# Patient Record
Sex: FEMALE | Race: WHITE | NOT HISPANIC OR LATINO | Employment: FULL TIME | URBAN - METROPOLITAN AREA
[De-identification: names, ages, dates, MRNs, and addresses within clinical notes are randomized per-mention and may not be internally consistent; named-entity substitution may affect disease eponyms.]

---
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
## 2023-04-26 ENCOUNTER — OFFICE VISIT (OUTPATIENT)
Dept: URGENT CARE | Facility: CLINIC | Age: 23
End: 2023-04-26
Payer: COMMERCIAL

## 2023-04-26 VITALS
TEMPERATURE: 98.7 F | WEIGHT: 117 LBS | HEIGHT: 66 IN | HEART RATE: 80 BPM | BODY MASS INDEX: 18.8 KG/M2 | RESPIRATION RATE: 16 BRPM | DIASTOLIC BLOOD PRESSURE: 78 MMHG | SYSTOLIC BLOOD PRESSURE: 118 MMHG | OXYGEN SATURATION: 96 %

## 2023-04-26 DIAGNOSIS — H00.019 HORDEOLUM EXTERNUM, UNSPECIFIED LATERALITY: ICD-10-CM

## 2023-04-26 DIAGNOSIS — Z30.46 ENCOUNTER FOR SURVEILLANCE OF NEXPLANON SUBDERMAL CONTRACEPTIVE: ICD-10-CM

## 2023-04-26 PROCEDURE — 99213 OFFICE O/P EST LOW 20 MIN: CPT | Performed by: PHYSICIAN ASSISTANT

## 2023-04-26 RX ORDER — OLANZAPINE 5 MG/1
5 TABLET ORAL NIGHTLY
COMMUNITY
End: 2023-06-02

## 2023-04-26 ASSESSMENT — ENCOUNTER SYMPTOMS
EYE REDNESS: 0
SHORTNESS OF BREATH: 0
NAUSEA: 0
CHILLS: 0
FEVER: 0
DIARRHEA: 0
VOMITING: 0
DIAPHORESIS: 0
WHEEZING: 0
CONSTIPATION: 0
ABDOMINAL PAIN: 0
EYE DISCHARGE: 0
DIZZINESS: 0
SINUS PAIN: 0
SORE THROAT: 0
HEADACHES: 0
COUGH: 0
EYE PAIN: 0

## 2023-04-27 NOTE — PROGRESS NOTES
Subjective:     Maryam Knowles  is a 22 y.o. female who presents for Stye (X 3 days, stye on Rt. Upper eyelid and Lt. Lower eyelid) and Arm Pain (X 1 month  Lt. Arm pain)       She presents today with stye over the right upper and left lower eyelids that have been ongoing for the last 3 days.  She has been doing warm compresses and gentle massage without symptom relief.  The left lower eyelid stye is nonpainful, is having pain with the right upper eyelid stye.  Does note previous history of stye that needed to be drained.  She states that symptoms began shortly [place].  Denies any orbital pain, no discharge from the eyes, no change in vision or blurry vision.  No fever/chills/sweats.    She also presents today with concerns over her Nexplanon implant in her left arm.  She states that she occasionally has a nerve pain over the region of the Nexplanon implant while performing bicep curl exercises at the gym.  No distal numbness or tingling.  No loss of strength.  The Nexplanon was placed at the [facility] office     Review of Systems   Constitutional:  Negative for chills, diaphoresis, fever and malaise/fatigue.   HENT:  Negative for congestion, ear discharge, sinus pain and sore throat.    Eyes:  Negative for pain, discharge and redness.        Right upper eyelid and left lower eyelid stye   Respiratory:  Negative for cough, shortness of breath and wheezing.    Cardiovascular:  Negative for chest pain.   Gastrointestinal:  Negative for abdominal pain, constipation, diarrhea, nausea and vomiting.   Genitourinary:  Negative for dysuria, frequency and urgency.   Neurological:  Negative for dizziness and headaches.    No Known Allergies  Past Medical History:   Diagnosis Date    Anxiety     Self-injurious behavior         Objective:   /78 (BP Location: Right arm, Patient Position: Sitting, BP Cuff Size: Adult)   Pulse 80   Temp 37.1 °C (98.7 °F) (Temporal)    "Resp 16   Ht 1.676 m (5' 6\")   Wt 53.1 kg (117 lb)   SpO2 96%   BMI 18.88 kg/m²   Physical Exam  Vitals and nursing note reviewed.   Constitutional:       General: She is not in acute distress.     Appearance: She is not ill-appearing or toxic-appearing.   HENT:      Head: Normocephalic.      Nose: No rhinorrhea.   Eyes:      General: No scleral icterus.        Right eye: No discharge.         Left eye: No discharge.      Extraocular Movements: Extraocular movements intact.      Conjunctiva/sclera: Conjunctivae normal.      Pupils: Pupils are equal, round, and reactive to light.      Comments: Physical examination of the eyes does reveal right upper eyelid stye and left lower eyelid stye.  No active drainage from the eyes.   Pulmonary:      Effort: Pulmonary effort is normal. No respiratory distress.      Breath sounds: No stridor.   Musculoskeletal:      Cervical back: Neck supple.   Skin:     Comments: Examination of the left arm does reveal Nexplanon implant being in place and without signs of infection or abnormality.  Both ends and the full length of the Nexplanon can be visualized and palpated through the skin.   Neurological:      Mental Status: She is alert and oriented to person, place, and time.   Psychiatric:         Mood and Affect: Mood normal.         Behavior: Behavior normal.         Thought Content: Thought content normal.         Judgment: Judgment normal.           Diagnostic testing: None    Assessment/Plan:     Encounter Diagnoses   Name Primary?    Hordeolum externum, unspecified laterality     Encounter for surveillance of Nexplanon subdermal contraceptive           Plan for care for today's complaint includes referral to primary care for concerns about Nexplanon.  Continue warm compresses and gentle massage for the hordeolum of the right upper and left lower eyelids.  Discussed prognosis of the hordeolum with the patient today.  Return to urgent care follow-up emergency department " symptoms worsen or become severe..    See AVS Instructions below for written guidance provided to patient on after-visit management and care in addition to our verbal discussion during the visit.    Please note that this dictation was created using voice recognition software. I have attempted to correct all errors, but there may be sound-alike, spelling, grammar and possibly content errors that I did not discover before finalizing the note.    Bladimir Andrew PA-C

## 2023-05-02 ENCOUNTER — DOCUMENTATION (OUTPATIENT)
Dept: OCCUPATIONAL MEDICINE | Facility: CLINIC | Age: 23
End: 2023-05-02
Payer: COMMERCIAL

## 2023-05-02 NOTE — PROGRESS NOTES
Pt has an appointment for Pre-Employment Physical with [facility] on 05/09/2023.    Currently missing the following vaccine documentation:    Tdap  Hep A  Varicella  MMR  Hep B  Flu Shot    Contacted via email on 05/02/2023, requesting missing documentation. If unable to obtain by the appointment  date, lab titers will be drawn, and/or vaccines will be given.

## 2023-05-09 ENCOUNTER — TELEPHONE (OUTPATIENT)
Dept: OCCUPATIONAL MEDICINE | Facility: CLINIC | Age: 23
End: 2023-05-09
Payer: COMMERCIAL

## 2023-05-09 ENCOUNTER — EH NON-PROVIDER (OUTPATIENT)
Dept: OCCUPATIONAL MEDICINE | Facility: CLINIC | Age: 23
End: 2023-05-09

## 2023-05-09 ENCOUNTER — HOSPITAL ENCOUNTER (OUTPATIENT)
Facility: MEDICAL CENTER | Age: 23
End: 2023-05-09
Attending: NURSE PRACTITIONER
Payer: COMMERCIAL

## 2023-05-09 DIAGNOSIS — Z02.89 ENCOUNTER FOR OCCUPATIONAL HEALTH ASSESSMENT: ICD-10-CM

## 2023-05-09 DIAGNOSIS — Z02.89 ENCOUNTER FOR OCCUPATIONAL HEALTH ASSESSMENT: Primary | ICD-10-CM

## 2023-05-09 LAB
AMP AMPHETAMINE: NORMAL
BAR BARBITURATES: NORMAL
BZO BENZODIAZEPINES: NORMAL
COC COCAINE: NORMAL
INT CON NEG: NORMAL
INT CON POS: NORMAL
MDMA ECSTASY: NORMAL
MET METHAMPHETAMINES: NORMAL
MTD METHADONE: NORMAL
OPI OPIATES: NORMAL
OXY OXYCODONE: NORMAL
PCP PHENCYCLIDINE: NORMAL
POC URINE DRUG SCREEN OCDRS: NORMAL
THC: NORMAL

## 2023-05-09 PROCEDURE — 80305 DRUG TEST PRSMV DIR OPT OBS: CPT | Performed by: NURSE PRACTITIONER

## 2023-05-09 PROCEDURE — 86480 TB TEST CELL IMMUN MEASURE: CPT | Performed by: PREVENTIVE MEDICINE

## 2023-05-09 NOTE — TELEPHONE ENCOUNTER
No provider will be in office this afternoon, we are wanting to see if the patient is okay with just doing the MA visit today and scheduling an appt for the provider another day this week? Or she could reschedule her entire appt for a different day this week. Unable to reach the patient and left a VM asking her to call me back.

## 2023-05-10 ENCOUNTER — EMPLOYEE HEALTH (OUTPATIENT)
Dept: OCCUPATIONAL MEDICINE | Facility: CLINIC | Age: 23
End: 2023-05-10

## 2023-05-10 DIAGNOSIS — Z02.89 ENCOUNTER FOR OCCUPATIONAL HEALTH ASSESSMENT: ICD-10-CM

## 2023-05-10 PROCEDURE — 8915 PR COMPREHENSIVE PHYSICAL: Performed by: PREVENTIVE MEDICINE

## 2023-05-11 LAB
GAMMA INTERFERON BACKGROUND BLD IA-ACNC: 0.03 IU/ML
M TB IFN-G BLD-IMP: NEGATIVE
M TB IFN-G CD4+ BCKGRND COR BLD-ACNC: 0.05 IU/ML
MITOGEN IGNF BCKGRD COR BLD-ACNC: >10 IU/ML
QFT TB2 - NIL TBQ2: 0.04 IU/ML

## 2023-05-15 ENCOUNTER — RESEARCH ENCOUNTER (OUTPATIENT)
Dept: RESEARCH | Facility: WORKSITE | Age: 23
End: 2023-05-15
Payer: COMMERCIAL

## 2023-05-15 DIAGNOSIS — Z00.6 RESEARCH STUDY PATIENT: ICD-10-CM

## 2023-05-30 SDOH — ECONOMIC STABILITY: TRANSPORTATION INSECURITY
IN THE PAST 12 MONTHS, HAS THE LACK OF TRANSPORTATION KEPT YOU FROM MEDICAL APPOINTMENTS OR FROM GETTING MEDICATIONS?: YES

## 2023-05-30 SDOH — ECONOMIC STABILITY: HOUSING INSECURITY
IN THE LAST 12 MONTHS, WAS THERE A TIME WHEN YOU DID NOT HAVE A STEADY PLACE TO SLEEP OR SLEPT IN A SHELTER (INCLUDING NOW)?: YES

## 2023-05-30 SDOH — ECONOMIC STABILITY: FOOD INSECURITY: WITHIN THE PAST 12 MONTHS, YOU WORRIED THAT YOUR FOOD WOULD RUN OUT BEFORE YOU GOT MONEY TO BUY MORE.: SOMETIMES TRUE

## 2023-05-30 SDOH — ECONOMIC STABILITY: TRANSPORTATION INSECURITY
IN THE PAST 12 MONTHS, HAS LACK OF TRANSPORTATION KEPT YOU FROM MEETINGS, WORK, OR FROM GETTING THINGS NEEDED FOR DAILY LIVING?: YES

## 2023-05-30 SDOH — ECONOMIC STABILITY: INCOME INSECURITY: HOW HARD IS IT FOR YOU TO PAY FOR THE VERY BASICS LIKE FOOD, HOUSING, MEDICAL CARE, AND HEATING?: NOT VERY HARD

## 2023-05-30 SDOH — ECONOMIC STABILITY: INCOME INSECURITY: IN THE LAST 12 MONTHS, WAS THERE A TIME WHEN YOU WERE NOT ABLE TO PAY THE MORTGAGE OR RENT ON TIME?: YES

## 2023-05-30 SDOH — ECONOMIC STABILITY: HOUSING INSECURITY: IN THE LAST 12 MONTHS, HOW MANY PLACES HAVE YOU LIVED?: 3

## 2023-05-30 SDOH — HEALTH STABILITY: PHYSICAL HEALTH: ON AVERAGE, HOW MANY MINUTES DO YOU ENGAGE IN EXERCISE AT THIS LEVEL?: 90 MIN

## 2023-05-30 SDOH — HEALTH STABILITY: PHYSICAL HEALTH: ON AVERAGE, HOW MANY DAYS PER WEEK DO YOU ENGAGE IN MODERATE TO STRENUOUS EXERCISE (LIKE A BRISK WALK)?: 4 DAYS

## 2023-05-30 SDOH — ECONOMIC STABILITY: FOOD INSECURITY: WITHIN THE PAST 12 MONTHS, THE FOOD YOU BOUGHT JUST DIDN'T LAST AND YOU DIDN'T HAVE MONEY TO GET MORE.: OFTEN TRUE

## 2023-05-30 SDOH — ECONOMIC STABILITY: TRANSPORTATION INSECURITY
IN THE PAST 12 MONTHS, HAS LACK OF RELIABLE TRANSPORTATION KEPT YOU FROM MEDICAL APPOINTMENTS, MEETINGS, WORK OR FROM GETTING THINGS NEEDED FOR DAILY LIVING?: YES

## 2023-05-30 SDOH — HEALTH STABILITY: MENTAL HEALTH
STRESS IS WHEN SOMEONE FEELS TENSE, NERVOUS, ANXIOUS, OR CAN'T SLEEP AT NIGHT BECAUSE THEIR MIND IS TROUBLED. HOW STRESSED ARE YOU?: VERY MUCH

## 2023-05-30 ASSESSMENT — SOCIAL DETERMINANTS OF HEALTH (SDOH)
HOW OFTEN DO YOU GET TOGETHER WITH FRIENDS OR RELATIVES?: MORE THAN THREE TIMES A WEEK
HOW OFTEN DO YOU HAVE SIX OR MORE DRINKS ON ONE OCCASION: MONTHLY
ARE YOU MARRIED, WIDOWED, DIVORCED, SEPARATED, NEVER MARRIED, OR LIVING WITH A PARTNER?: NEVER MARRIED
HOW OFTEN DO YOU GET TOGETHER WITH FRIENDS OR RELATIVES?: MORE THAN THREE TIMES A WEEK
HOW OFTEN DO YOU ATTEND CHURCH OR RELIGIOUS SERVICES?: MORE THAN 4 TIMES PER YEAR
WITHIN THE PAST 12 MONTHS, YOU WORRIED THAT YOUR FOOD WOULD RUN OUT BEFORE YOU GOT THE MONEY TO BUY MORE: SOMETIMES TRUE
DO YOU BELONG TO ANY CLUBS OR ORGANIZATIONS SUCH AS CHURCH GROUPS UNIONS, FRATERNAL OR ATHLETIC GROUPS, OR SCHOOL GROUPS?: NO
HOW OFTEN DO YOU ATTEND CHURCH OR RELIGIOUS SERVICES?: MORE THAN 4 TIMES PER YEAR
IN A TYPICAL WEEK, HOW MANY TIMES DO YOU TALK ON THE PHONE WITH FAMILY, FRIENDS, OR NEIGHBORS?: MORE THAN THREE TIMES A WEEK
ARE YOU MARRIED, WIDOWED, DIVORCED, SEPARATED, NEVER MARRIED, OR LIVING WITH A PARTNER?: NEVER MARRIED
HOW OFTEN DO YOU ATTENT MEETINGS OF THE CLUB OR ORGANIZATION YOU BELONG TO?: NEVER
IN A TYPICAL WEEK, HOW MANY TIMES DO YOU TALK ON THE PHONE WITH FAMILY, FRIENDS, OR NEIGHBORS?: MORE THAN THREE TIMES A WEEK
HOW OFTEN DO YOU HAVE A DRINK CONTAINING ALCOHOL: 2-4 TIMES A MONTH
HOW HARD IS IT FOR YOU TO PAY FOR THE VERY BASICS LIKE FOOD, HOUSING, MEDICAL CARE, AND HEATING?: NOT VERY HARD
DO YOU BELONG TO ANY CLUBS OR ORGANIZATIONS SUCH AS CHURCH GROUPS UNIONS, FRATERNAL OR ATHLETIC GROUPS, OR SCHOOL GROUPS?: NO
HOW OFTEN DO YOU ATTENT MEETINGS OF THE CLUB OR ORGANIZATION YOU BELONG TO?: NEVER
HOW MANY DRINKS CONTAINING ALCOHOL DO YOU HAVE ON A TYPICAL DAY WHEN YOU ARE DRINKING: 5 OR 6

## 2023-05-30 ASSESSMENT — LIFESTYLE VARIABLES
HOW OFTEN DO YOU HAVE SIX OR MORE DRINKS ON ONE OCCASION: MONTHLY
SKIP TO QUESTIONS 9-10: 0
AUDIT-C TOTAL SCORE: 6
HOW OFTEN DO YOU HAVE A DRINK CONTAINING ALCOHOL: 2-4 TIMES A MONTH
HOW MANY STANDARD DRINKS CONTAINING ALCOHOL DO YOU HAVE ON A TYPICAL DAY: 5 OR 6

## 2023-05-31 ENCOUNTER — EH NON-PROVIDER (OUTPATIENT)
Dept: OCCUPATIONAL MEDICINE | Facility: CLINIC | Age: 23
End: 2023-05-31

## 2023-06-02 ENCOUNTER — OFFICE VISIT (OUTPATIENT)
Dept: MEDICAL GROUP | Facility: CLINIC | Age: 23
End: 2023-06-02
Attending: PHYSICIAN ASSISTANT
Payer: COMMERCIAL

## 2023-06-02 VITALS
HEART RATE: 73 BPM | RESPIRATION RATE: 16 BRPM | WEIGHT: 121 LBS | DIASTOLIC BLOOD PRESSURE: 60 MMHG | SYSTOLIC BLOOD PRESSURE: 102 MMHG | HEIGHT: 66 IN | OXYGEN SATURATION: 97 % | BODY MASS INDEX: 19.44 KG/M2

## 2023-06-02 DIAGNOSIS — Z11.4 SCREENING FOR HIV (HUMAN IMMUNODEFICIENCY VIRUS): ICD-10-CM

## 2023-06-02 DIAGNOSIS — G89.29 CHRONIC MIDLINE LOW BACK PAIN WITHOUT SCIATICA: ICD-10-CM

## 2023-06-02 DIAGNOSIS — T78.40XD ALLERGIC REACTION, SUBSEQUENT ENCOUNTER: ICD-10-CM

## 2023-06-02 DIAGNOSIS — Z11.59 ENCOUNTER FOR HEPATITIS C SCREENING TEST FOR LOW RISK PATIENT: ICD-10-CM

## 2023-06-02 DIAGNOSIS — M54.50 CHRONIC MIDLINE LOW BACK PAIN WITHOUT SCIATICA: ICD-10-CM

## 2023-06-02 DIAGNOSIS — Z30.46 ENCOUNTER FOR SURVEILLANCE OF NEXPLANON SUBDERMAL CONTRACEPTIVE: ICD-10-CM

## 2023-06-02 DIAGNOSIS — Z11.3 ROUTINE SCREENING FOR STI (SEXUALLY TRANSMITTED INFECTION): ICD-10-CM

## 2023-06-02 PROBLEM — F32.A DEPRESSION: Status: ACTIVE | Noted: 2021-03-10

## 2023-06-02 PROBLEM — F41.1 GAD (GENERALIZED ANXIETY DISORDER): Status: RESOLVED | Noted: 2021-03-10 | Resolved: 2023-06-02

## 2023-06-02 PROBLEM — F32.9 MAJOR DEPRESSIVE DISORDER: Status: RESOLVED | Noted: 2021-03-10 | Resolved: 2023-06-02

## 2023-06-02 PROCEDURE — 3078F DIAST BP <80 MM HG: CPT | Performed by: STUDENT IN AN ORGANIZED HEALTH CARE EDUCATION/TRAINING PROGRAM

## 2023-06-02 PROCEDURE — 3074F SYST BP LT 130 MM HG: CPT | Performed by: STUDENT IN AN ORGANIZED HEALTH CARE EDUCATION/TRAINING PROGRAM

## 2023-06-02 PROCEDURE — 99203 OFFICE O/P NEW LOW 30 MIN: CPT | Mod: GE | Performed by: STUDENT IN AN ORGANIZED HEALTH CARE EDUCATION/TRAINING PROGRAM

## 2023-06-02 RX ORDER — LORATADINE 10 MG/1
10 TABLET ORAL DAILY
Qty: 30 TABLET | Refills: 11 | Status: SHIPPED | OUTPATIENT
Start: 2023-06-02 | End: 2023-06-16

## 2023-06-02 ASSESSMENT — PATIENT HEALTH QUESTIONNAIRE - PHQ9
5. POOR APPETITE OR OVEREATING: 2 - MORE THAN HALF THE DAYS
CLINICAL INTERPRETATION OF PHQ2 SCORE: 1
SUM OF ALL RESPONSES TO PHQ QUESTIONS 1-9: 8

## 2023-06-03 NOTE — PROGRESS NOTES
[facility] FAMILY MEDICINE OFFICE VISIT    Date: 6/2/2023    MRN: [id number]  Patient ID: Maryam Knowles    SUBJECTIVE:  Maryam Knowles is a 22 y.o. female here to establish care.  Patient with several concerns for today's visit.  Maryam reports a history of allergic reaction in which her face developed a rash and she felt sensation of shortness of breath.  Was evaluated at [facility] for this approximately 1 year ago.  Patient notes that she had recently purchased a pet cat at that time.  Continues to have the cat at home and notes that she does feel as though she is allergic to the cat, however has never had formal allergen testing.  Has never had a similar episode which was concerning for anaphylaxis.  Has tried taking over-the-counter antihistamines including cetirizine and Benadryl, however notes that these have caused her to feel somewhat sedated.    She reports a history of chronic low back pain.  This is worse when standing for extended durations.  Occasionally takes Tylenol or ibuprofen if pain is significant enough, however does not use on a daily basis.  Does not engage in any regular back stretching.  Patient notes that this started around the age of 10, but that she was much taller than her peers at that age, and has always been so.    Patient also with questions about her Nexplanon.  Reports that this was placed at an outside facility in October.  States that at certain times when she moves her arm, she feels a twinge of sharp shooting pain which extends up her left arm.  This last for split-second, then stops.  Patient curious about whether this could indicate a problem with the Nexplanon.    PMHx/PSHx:  Past Medical History:   Diagnosis Date    Anxiety     Self-injurious behavior      History reviewed. No pertinent surgical history.    Allergies: Patient has no known allergies.    Social History: Single, working in food services, previously sexually active. Denies tobacco use. Occasional  "alcohol use socially.     Family History: Mother  from cirrhosis. No known family history of breast, ovarian, or colon cancers.     OBJECTIVE:  Vitals:    23 1341   BP: 102/60   Pulse: 73   Resp: 16   SpO2: 97%     Vitals:    23 1341   BP: 102/60   Weight: 54.9 kg (121 lb)   Height: 1.676 m (5' 6\")       Physical Examination:  General: Well appearing thin female in no acute distress, resting on arrival to room  HEENT: Normocephalic, atraumatic, EOMI  Cardiovascular: RRR, no murmurs, gallops, or rubs  Pulmonary: CTAB, symmetrical chest expansion, no rales, rhonchi, or wheezes  Abdominal: Non-tender to palpation, no guarding, rigidity, or distension  Extremities: Moves all spontaneously, subdermal implant palpable within left upper arm adjacent to the biceps groove, full range of motion back extension/flexion/lateral flexion/rotation, nontender to palpation of entirety of spinous processes, symmetrical hip height  Neurological: Alert and oriented    ASSESSMENT & PLAN:  Maryam Knowles is a 22 y.o. female here for follow-up, with concerns as addressed below.    1. Allergic reaction, subsequent encounter  Referral to Allergy    loratadine (CLARITIN) 10 MG Tab      2. Chronic midline low back pain without sciatica  Referral to Physical Therapy      3. Screening for HIV (human immunodeficiency virus)  HIV AG/AB COMBO ASSAY SCREENING      4. Encounter for hepatitis C screening test for low risk patient  HEP C VIRUS ANTIBODY      5. Routine screening for STI (sexually transmitted infection)  Chlamydia/GC, PCR (Urine)      6. Encounter for surveillance of Nexplanon subdermal contraceptive            Orders Placed This Encounter    Chlamydia/GC, PCR (Urine)    HIV AG/AB COMBO ASSAY SCREENING    HEP C VIRUS ANTIBODY    Referral to Allergy    Referral to Physical Therapy    etonogestrel (NEXPLANON) 68 MG Implant implant    loratadine (CLARITIN) 10 MG Tab       #Allergic reaction  At this time have " referred patient to allergist for formal allergy testing.  Referrals process discussed.  As patient has not had any further reactions suggestive of anaphylaxis, especially around her own cat which previously caused this condition, do not currently feel that EpiPen is necessarily warranted at this time.  With any further episodes, would plan to prescribe this medication.  Discussed potential treatment with loratadine 10 mg oral nightly, and discussed that this could cause sedation.  Medication sent to pharmacy of choice.  Patient follow-up with allergist for further assessment of this issue.    #Chronic midline low back pain without sciatica  Discussed with patient that in the setting of being a tall thin female, this most likely represents simple chronic muscle strain secondary to above listed issues.  Recommended daily stretching, and discussed potential methods by which to do this.  At this time physician has also placed a referral to physical therapy for further assessment and recommendations.  Referrals process discussed.  Advised patient should this fail to make any improvement in extent of back pain, or should this worsen, she may return here for repeat evaluation.  Patient verbalized understanding.    #Screen for HIV  Ordered HIV screening for patient.    #Screening for hepatitis C  Ordered hepatitis C antibody screening for patient.    #Routine screening for STI  Ordered urine GC/CT testing for patient.    #Nexplanon surveillance  Discussed with patient that position appears appropriate.  Discussed that most likely etiology for described symptoms is that the Nexplanon occasionally impinges upon a nerve, causing temporary sensation of shooting pain.  Discussed that should this increase in frequency or become bothersome enough that she wishes the device removed, she may notify this physician who will then schedule procedure for removal.  Patient verbalized understanding.    Javier Emmanuel M.D.  Family Medicine  Resident  PGY-4

## 2023-06-08 LAB
APOB+LDLR+PCSK9 GENE MUT ANL BLD/T: NOT DETECTED
BRCA1+BRCA2 DEL+DUP + FULL MUT ANL BLD/T: NOT DETECTED
MLH1+MSH2+MSH6+PMS2 GN DEL+DUP+FUL M: NOT DETECTED

## 2023-06-16 DIAGNOSIS — T78.40XD ALLERGIC REACTION, SUBSEQUENT ENCOUNTER: ICD-10-CM

## 2023-06-16 RX ORDER — LORATADINE 10 MG/1
10 TABLET ORAL DAILY
Qty: 90 TABLET | Refills: 4 | Status: SHIPPED | OUTPATIENT
Start: 2023-06-16 | End: 2024-02-07 | Stop reason: SDUPTHER

## 2023-08-02 ENCOUNTER — PHYSICAL THERAPY (OUTPATIENT)
Dept: PHYSICAL THERAPY | Facility: REHABILITATION | Age: 23
End: 2023-08-02
Attending: STUDENT IN AN ORGANIZED HEALTH CARE EDUCATION/TRAINING PROGRAM
Payer: COMMERCIAL

## 2023-08-02 DIAGNOSIS — G89.29 CHRONIC MIDLINE LOW BACK PAIN WITHOUT SCIATICA: ICD-10-CM

## 2023-08-02 DIAGNOSIS — M54.50 CHRONIC MIDLINE LOW BACK PAIN WITHOUT SCIATICA: ICD-10-CM

## 2023-08-02 PROCEDURE — 97162 PT EVAL MOD COMPLEX 30 MIN: CPT

## 2023-08-02 PROCEDURE — 97110 THERAPEUTIC EXERCISES: CPT

## 2023-08-02 ASSESSMENT — ENCOUNTER SYMPTOMS
PAIN SCALE: 7
PAIN SCALE AT HIGHEST: 10
PAIN SCALE AT LOWEST: 0

## 2023-08-02 NOTE — OP THERAPY EVALUATION
"  Outpatient Physical Therapy  INITIAL EVALUATION    [facility] Physical Therapy [address]  [address]  [place] [address]  Phone:  [phone]  Fax:  [phone]    Date of Evaluation: 08/02/2023    Patient: Maryam Knowles  YOB: 2000  MRN: [id number]     Referring Provider: Javier Emmanuel M.D.  [address] [place] [address]   Referring Diagnosis Chronic midline low back pain without sciatica [M54.50, G89.29]     Time Calculation  Start time: 1535  Stop time: 1615 Time Calculation (min): 40 minutes         Chief Complaint: Back Problem    Visit Diagnoses     ICD-10-CM   1. Chronic midline low back pain without sciatica  M54.50    G89.29       Date of onset of impairment: 8/2/2023    Subjective:   History of Present Illness:     Mechanism of injury:  Pt states that she has pn in the middle of her low back. She feels the pain 5 days a week, every day she works. She doesn't feel the pn any other time other than work. She states she doesn't do any activities outside of work. She states that she has a family hx of scoliosis but was never dx's. She had not received imaging. She states her back has bothered her since middle school. She had PT when she was 18 and that didn't help. She occasionally gets upper t/s pn but not often.  She can walk w/o limitations. She prefers to sit. 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 Sleeping is fine. She states that she walks a lot at work so when not working prefers to lay down and not walk.    Aggs:   Work- pn inc'd after 3-4 hr, can cont for 9 hr shift, takes Ibu. Takes the rest of the night for pn to improve.   Carrying food tray at work     Ease: Ibu, laying down       HPI 6/2/23  \"She reports a history of chronic low back pain.  This is worse when standing for extended durations.  Occasionally takes Tylenol or ibuprofen if pain is significant enough, " "however does not use on a daily basis.  Does not engage in any regular back stretching.  Patient notes that this started around the age of 10, but that she was much taller than her peers at that age, and has always been so.\"  Pain:     Current pain ratin    At best pain ratin    At worst pain rating:  10  Patient Goals:     Other patient goals:  Exercsies to build strength to manage pn      Past Medical History:   Diagnosis Date    Anxiety     Self-injurious behavior      No past surgical history on file.  Social History     Tobacco Use    Smoking status: Never    Smokeless tobacco: Never   Substance Use Topics    Alcohol use: Not Currently     Comment: a couple  of drinks every other month     Family and Occupational History     Socioeconomic History    Marital status: Single     Spouse name: Not on file    Number of children: Not on file    Years of education: Not on file    Highest education level: Some college, no degree   Occupational History    Not on file       Objective     General Comments     Spine Comments   L/s AROM:  Flex: WNL  Ext: inc'd pn  LLF: inc'd pn  RLF: WNL    Repeated motions:   Flex: NT  Ext: same but not fully conclusive     Bridge hold: 3'   Superman hold: 2'     Passive SLR: neg    Dermatomes: WNL  DTR: 2+ global UE and LE     Hip MMT:   Flex: 5 bilat  Ext: NT  Abd: 4- bilat  IR:5 bilat  ER:4 bilat    Hip PROM:  WNL    Posture: sig kyphosis, rounded shoulder, FHP- able to correct w/ cuing          Therapeutic Exercises (CPT 57132):     2. press up, 10x10\"      Therapeutic Exercise Summary: Access Code: [email or web address]  URL: [email or web address]  Date: 2023  Prepared by: Katie Guardado    Exercises  - Prone Press Up  - 3 x daily - 7 x weekly - 5-10 reps - 10 seconds hold      Time-based treatments/modalities:    Physical Therapy Timed Treatment Charges  Therapeutic exercise minutes (CPT 23473): 10 minutes      Assessment, Response and Plan:   Impairments: abnormal ADL " function, activity intolerance, difficulty performing job, impaired physical strength, lacks appropriate home exercise program, limited ADL's and pain with function    Assessment details:  Ms. Knowles is a 23 y/o female who presents in PT w/ s/s consistent w/ possible postural deficits and endurance and extension sensitivity. She presents w/ deficits in pn greatest w/ l/s ext and LLF, sig poor seated and standing posture w/ inc'd kyphosis, poor endurance, reduced pn w/ trunk extensor holds, and hyperflexiblity that are preventing her from working long hours w/o inc'd pn. Pt will cont to benefit from PT at this time in order to address her functional limitations and help her reach her functional goals.       Barriers to therapy:  Psychosocial, poorly tolerated treatments and age  Prognosis: good    Goals:   Short Term Goals:   Pt will demo good compliance to HEP   Pt will demo PB superman hold for 3' w/ 2# weight  Pt will demo improved resting posture w/ minimal cuing  Short term goal time span:  2-4 weeks      Long Term Goals:    Pt will be able to work for 9 hrs and mange sx's throughout shift  Pt will no longer have to take medication for pn>1x/mnth w/ work  Pt will improved MAYANK to <8  Long term goal time span:  6-8 weeks    Plan:   Therapy options:  Physical therapy treatment to continue  Planned therapy interventions:  Neuromuscular Re-education (CPT 88890), Manual Therapy (CPT 35064), E Stim Unattended (CPT 67446), Therapeutic Activities (CPT 10221), Therapeutic Exercise (CPT 57063), Mechanical Traction (CPT 91019) and Gait Training (CPT 05533)  Frequency:  1x week  Duration in weeks:  8  Duration in visits:  8  Discussed with:  Patient  Plan details:  Follow up on press up for ext desensitization  Progress endurance and postural work      Functional Assessment Used  Oswestry Low Back Pain Disability Total Score: 20     Referring provider co-signature:  I have reviewed this plan of care and my co-signature  certifies the need for services.    Certification Period: 08/02/2023 to  09/27/23    Physician Signature: ________________________________ Date: ______________

## 2023-08-16 ENCOUNTER — APPOINTMENT (OUTPATIENT)
Dept: PHYSICAL THERAPY | Facility: REHABILITATION | Age: 23
End: 2023-08-16
Attending: STUDENT IN AN ORGANIZED HEALTH CARE EDUCATION/TRAINING PROGRAM
Payer: COMMERCIAL

## 2023-08-22 NOTE — OP THERAPY DAILY TREATMENT
"  Outpatient Physical Therapy  DAILY TREATMENT     [facility] Physical [address].  [address]  [place] [address]  Phone:  [phone]  Fax:  [phone]    Date: 08/23/2023    Patient: Maryam Knowles  YOB: 2000  MRN: [id number]     Time Calculation    Start time: 1530  Stop time: 1610 Time Calculation (min): 40 minutes         Chief Complaint: No chief complaint on file.    Visit #: 2    SUBJECTIVE:  Pt states that she worked a double on Saturday and her back wasn't as painful. She has been doing the press up most night. She has been working on her posture.     Aggs:   Work- pn inc'd after 3-4 hr, can cont for 9 hr shift, takes Ibu. Takes the rest of the night for pn to improve.   Carrying food tray at work      Ease: Ibu, laying down     OBJECTIVE:  L/s AROM:  Flex: WNL  Ext: WNL  LLF: WNL  RLF: WNL     Repeated motions:   Flex: NT  Ext: same but not fully conclusive      Bridge hold: 3'   Superman hold: 2'      Passive SLR: neg     Dermatomes: WNL  DTR: 2+ global UE and LE      Hip MMT:   Flex: 5 bilat  Ext: NT  Abd: 4- bilat  IR:5 bilat  ER:4 bilat     Hip PROM:  WNL     Posture: sig kyphosis, rounded shoulder, FHP- able to correct w/ cuing             Therapeutic Exercises (CPT 90037):     2. press up, 10x10\"    3. full superman, 2'    4. prone t/s ext hold, 26\", 49\", 58\"    5. wall angels, 2x2, slow and controlled    6. 90/90 ER w/ OH press, PTB 2x reps to fatigue    7. PB supermans holds, 1xfat, 1xfat w/ 1lb    8. mod hollow hold at 90/90, 2xfat, ~30\"      Therapeutic Exercise Summary: Access Code: [email or web address]  URL: [email or web address]  Date: 08/23/2023  Prepared by: Katie Guardado    Exercises  - Supine 90/90 Abdominal Bracing  - 1 x daily - 4 x weekly - 1 minute hold  - Prone Upper Back Extension Off Table with Hands Behind Head  - 1 x daily - 4 x weekly - 2 minutes hold  - Wall [place]  - 1 x daily - 4 x weekly - 2 sets - 3 reps  - Prone Press Up  - 1 " x daily - 7 x weekly - 10 reps - 10 seconds hold      Time-based treatments/modalities:    Physical Therapy Timed Treatment Charges  Therapeutic exercise minutes (CPT 92684): 40 minutes    ASSESSMENT:   Response to treatment: Pt demo'd good compliance w/ HEP and had sig reduce pn btwn session. She had good tolerance to all strength progression w/ greatest difficulty w/ core and upper thoracic ext. Pt will cont to benefit from PT at this time.     Goals:   Short Term Goals:   Pt will demo good compliance to HEP   Pt will demo PB superman hold for 3' w/ 2# weight  Pt will demo improved resting posture w/ minimal cuing  Short term goal time span:  2-4 weeks      Long Term Goals:    Pt will be able to work for 9 hrs and mange sx's throughout shift  Pt will no longer have to take medication for pn>1x/mnth w/ work  Pt will improved MAYANK to <8  Long term goal time span:  6-8 weeks    PLAN/RECOMMENDATIONS:   Follow up on press up for ext desensitization  Progress endurance and postural work

## 2023-08-23 ENCOUNTER — PHYSICAL THERAPY (OUTPATIENT)
Dept: PHYSICAL THERAPY | Facility: REHABILITATION | Age: 23
End: 2023-08-23
Attending: STUDENT IN AN ORGANIZED HEALTH CARE EDUCATION/TRAINING PROGRAM
Payer: COMMERCIAL

## 2023-08-23 DIAGNOSIS — M54.50 CHRONIC MIDLINE LOW BACK PAIN WITHOUT SCIATICA: ICD-10-CM

## 2023-08-23 DIAGNOSIS — G89.29 CHRONIC MIDLINE LOW BACK PAIN WITHOUT SCIATICA: ICD-10-CM

## 2023-08-23 PROCEDURE — 97162 PT EVAL MOD COMPLEX 30 MIN: CPT

## 2023-08-23 PROCEDURE — 97110 THERAPEUTIC EXERCISES: CPT

## 2023-08-30 ENCOUNTER — APPOINTMENT (OUTPATIENT)
Dept: PHYSICAL THERAPY | Facility: REHABILITATION | Age: 23
End: 2023-08-30
Attending: STUDENT IN AN ORGANIZED HEALTH CARE EDUCATION/TRAINING PROGRAM
Payer: COMMERCIAL

## 2023-09-05 NOTE — OP THERAPY DAILY TREATMENT
"  Outpatient Physical Therapy  DAILY TREATMENT     [facility] Physical [address].  [address]  [place] [address]  Phone:  [phone]  Fax:  [phone]    Date: 09/06/2023    Patient: Maryam Knowles  YOB: 2000  MRN: [id number]     Time Calculation    Start time: 1530  Stop time: 1610 Time Calculation (min): 40 minutes         Chief Complaint: No chief complaint on file.    Visit #: 3    SUBJECTIVE:  Pt states that she has had chest pain since Sunday. It hurts when she inhales. She states that she worked 50 hrs in 3 days and had to work 4 double shifts. She is exhausted. She only had time to do her exercises once b/c of work.     Aggs:   Work- pn inc'd after 3-4 hr, can cont for 9 hr shift, takes Ibu. Takes the rest of the night for pn to improve.   Carrying food tray at work      Ease: Ibu, laying down     OBJECTIVE:  HR 71  BP: 115/69 mmHg     Pn w/ sternal AP and costosternal joints on L. (Consent recieved before palpation)    L/s AROM:  Flex: WNL  Ext: WNL  LLF: WNL  RLF: WNL     Repeated motions:   Flex: NT  Ext: same but not fully conclusive      Bridge hold: 3'   Superman hold: 2'      Passive SLR: neg     Dermatomes: WNL  DTR: 2+ global UE and LE      Hip MMT:   Flex: 5 bilat  Ext: NT  Abd: 4- bilat  IR:5 bilat  ER:4 bilat     Hip PROM:  WNL     Posture: sig kyphosis, rounded shoulder, FHP- able to correct w/ cuing             Therapeutic Exercises (CPT 78756):     2. press up, 10x10\", NT    4. prone t/s ext hold, 2xfatigue    5. prone angels, 2x5    6. 90/90 ER w/ OH press, PTB 2x reps to fatigue, NT    7. PB supermans holds, 1# 2xfat hold    8. mod hollow hold at 90/90, 2xfat, ~30\"    9. FR series, x10 ea and 2 min pec str    10. FR thread the needle, x10 ea    11. OH carry, 12# 1xfat      Therapeutic Exercise Summary: Access Code: [email or web address]  URL: [email or web address]  Date: 08/23/2023  Prepared by: Katie Guardado    Exercises  - Supine 90/90 " Abdominal Bracing  - 1 x daily - 4 x weekly - 1 minute hold  - Prone Upper Back Extension Off Table with Hands Behind Head  - 1 x daily - 4 x weekly - 2 minutes hold  - Wall [place]  - 1 x daily - 4 x weekly - 2 sets - 3 reps  - Prone Press Up  - 1 x daily - 7 x weekly - 10 reps - 10 seconds hold      Time-based treatments/modalities:    Physical Therapy Timed Treatment Charges  Therapeutic exercise minutes (CPT 33455): 40 minutes    ASSESSMENT:   Pt was educated about importance of managing sleep schedule and stress levels. She had WNL vitals today and chest pain was reproduced w/ costosternal mobilization. Chest pn was alleviations w/ FR stretching. Pt tolerated all strength training well. Pt was advised to manage pn w/ HEP and will cont to benefit from PT every other week.    Goals:   Short Term Goals:   Pt will demo good compliance to HEP   Pt will demo PB superman hold for 3' w/ 2# weight  Pt will demo improved resting posture w/ minimal cuing  Short term goal time span:  2-4 weeks      Long Term Goals:    Pt will be able to work for 9 hrs and mange sx's throughout shift  Pt will no longer have to take medication for pn>1x/mnth w/ work  Pt will improved MAYANK to <8  Long term goal time span:  6-8 weeks    PLAN/RECOMMENDATIONS:   Follow up on press up for ext desensitization  Progress endurance and postural work

## 2023-09-06 ENCOUNTER — PHYSICAL THERAPY (OUTPATIENT)
Dept: PHYSICAL THERAPY | Facility: REHABILITATION | Age: 23
End: 2023-09-06
Attending: STUDENT IN AN ORGANIZED HEALTH CARE EDUCATION/TRAINING PROGRAM
Payer: COMMERCIAL

## 2023-09-06 DIAGNOSIS — M54.50 CHRONIC MIDLINE LOW BACK PAIN WITHOUT SCIATICA: ICD-10-CM

## 2023-09-06 DIAGNOSIS — G89.29 CHRONIC MIDLINE LOW BACK PAIN WITHOUT SCIATICA: ICD-10-CM

## 2023-09-06 PROCEDURE — 97110 THERAPEUTIC EXERCISES: CPT

## 2023-09-13 ENCOUNTER — APPOINTMENT (OUTPATIENT)
Dept: PHYSICAL THERAPY | Facility: REHABILITATION | Age: 23
End: 2023-09-13
Attending: STUDENT IN AN ORGANIZED HEALTH CARE EDUCATION/TRAINING PROGRAM
Payer: COMMERCIAL

## 2023-09-18 ENCOUNTER — HOSPITAL ENCOUNTER (OUTPATIENT)
Dept: RADIOLOGY | Facility: MEDICAL CENTER | Age: 23
End: 2023-09-18
Attending: PHYSICIAN ASSISTANT
Payer: COMMERCIAL

## 2023-09-18 DIAGNOSIS — S49.91XA INJURY OF RIGHT SHOULDER, INITIAL ENCOUNTER: ICD-10-CM

## 2023-09-18 PROCEDURE — 73090 X-RAY EXAM OF FOREARM: CPT | Mod: RT

## 2023-09-18 PROCEDURE — 73070 X-RAY EXAM OF ELBOW: CPT | Mod: RT

## 2023-09-18 PROCEDURE — 73060 X-RAY EXAM OF HUMERUS: CPT | Mod: RT

## 2023-09-19 NOTE — OP THERAPY DAILY TREATMENT
"  Outpatient Physical Therapy  DAILY TREATMENT     [facility] Physical [address].  [address]  [place] [address]  Phone:  [phone]  Fax:  [phone]    Date: 09/20/2023    Patient: Maryam Knowles  YOB: 2000  MRN: [id number]     Time Calculation                   Chief Complaint: No chief complaint on file.    Visit #: 4    SUBJECTIVE:  Pt states that she has had chest pain since Sunday. It hurts when she inhales. She states that she worked 50 hrs in 3 days and had to work 4 double shifts. She is exhausted. She only had time to do her exercises once b/c of work.     Aggs:   Work- pn inc'd after 3-4 hr, can cont for 9 hr shift, takes Ibu. Takes the rest of the night for pn to improve.   Carrying food tray at work      Ease: Ibu, laying down     OBJECTIVE:  HR 71  BP: 115/69 mmHg     Pn w/ sternal AP and costosternal joints on L. (Consent recieved before palpation)    L/s AROM:  Flex: WNL  Ext: WNL  LLF: WNL  RLF: WNL     Repeated motions:   Flex: NT  Ext: same but not fully conclusive      Bridge hold: 3'   Superman hold: 2'      Passive SLR: neg     Dermatomes: WNL  DTR: 2+ global UE and LE      Hip MMT:   Flex: 5 bilat  Ext: NT  Abd: 4- bilat  IR:5 bilat  ER:4 bilat     Hip PROM:  WNL     Posture: sig kyphosis, rounded shoulder, FHP- able to correct w/ cuing             Therapeutic Exercises (CPT 67151):     2. press up, 10x10\", NT    4. prone t/s ext hold, 2xfatigue    5. prone angels, 2x5    6. 90/90 ER w/ OH press, PTB 2x reps to fatigue, NT    7. PB supermans holds, 1# 2xfat hold    8. mod hollow hold at 90/90, 2xfat, ~30\"    9. FR series, x10 ea and 2 min pec str    10. FR thread the needle, x10 ea    11. OH carry, 12# 1xfat      Therapeutic Exercise Summary: Access Code: [email or web address]  URL: [email or web address]  Date: 08/23/2023  Prepared by: Katie Guardado    Exercises  - Supine 90/90 Abdominal Bracing  - 1 x daily - 4 x weekly - 1 minute hold  - Prone " Upper Back Extension Off Table with Hands Behind Head  - 1 x daily - 4 x weekly - 2 minutes hold  - Wall [place]  - 1 x daily - 4 x weekly - 2 sets - 3 reps  - Prone Press Up  - 1 x daily - 7 x weekly - 10 reps - 10 seconds hold      Time-based treatments/modalities:         ASSESSMENT:   Pt was educated about importance of managing sleep schedule and stress levels. She had WNL vitals today and chest pain was reproduced w/ costosternal mobilization. Chest pn was alleviations w/ FR stretching. Pt tolerated all strength training well. Pt was advised to manage pn w/ HEP and will cont to benefit from PT every other week.    Goals:   Short Term Goals:   Pt will demo good compliance to HEP   Pt will demo PB superman hold for 3' w/ 2# weight  Pt will demo improved resting posture w/ minimal cuing  Short term goal time span:  2-4 weeks      Long Term Goals:    Pt will be able to work for 9 hrs and mange sx's throughout shift  Pt will no longer have to take medication for pn>1x/mnth w/ work  Pt will improved MAYANK to <8  Long term goal time span:  6-8 weeks    PLAN/RECOMMENDATIONS:   Follow up on press up for ext desensitization  Progress endurance and postural work

## 2023-09-20 ENCOUNTER — APPOINTMENT (OUTPATIENT)
Dept: PHYSICAL THERAPY | Facility: REHABILITATION | Age: 23
End: 2023-09-20
Attending: STUDENT IN AN ORGANIZED HEALTH CARE EDUCATION/TRAINING PROGRAM
Payer: COMMERCIAL

## 2023-09-20 ENCOUNTER — TELEPHONE (OUTPATIENT)
Dept: PHYSICAL THERAPY | Facility: REHABILITATION | Age: 23
End: 2023-09-20
Payer: COMMERCIAL

## 2023-09-20 NOTE — OP THERAPY DISCHARGE SUMMARY
Outpatient Physical Therapy  DISCHARGE SUMMARY NOTE      [facility] Therapy [address].  [address]  [place] [address]  Phone:  [phone]  Fax:  [phone]    Date of Visit: 09/20/2023    Patient: Maryam Knowles  YOB: 2000  MRN: [id number]     Referring Provider: Javier Emmanuel M.D.   Referring Diagnosis  Chronic midline low back pain without sciatica [M54.50, G89.29]          Your patient is being discharged from Physical Therapy with the following comments:   Goals partially met    Comments:  Pt is self discharged at this time as they have requested to cancel all future appointments as she broke her arm and is unable to cont PT. Pt was advised in previous sessions to contact PT as needed w/ any questions or concerns. PT may be beneficial when ready to return.      Katie Guardado, PT    Date: 9/20/2023

## 2023-09-27 ENCOUNTER — APPOINTMENT (OUTPATIENT)
Dept: PHYSICAL THERAPY | Facility: REHABILITATION | Age: 23
End: 2023-09-27
Attending: STUDENT IN AN ORGANIZED HEALTH CARE EDUCATION/TRAINING PROGRAM
Payer: COMMERCIAL

## 2023-10-04 ENCOUNTER — APPOINTMENT (OUTPATIENT)
Dept: PHYSICAL THERAPY | Facility: REHABILITATION | Age: 23
End: 2023-10-04
Attending: STUDENT IN AN ORGANIZED HEALTH CARE EDUCATION/TRAINING PROGRAM
Payer: COMMERCIAL

## 2023-12-23 ENCOUNTER — HOSPITAL ENCOUNTER (OUTPATIENT)
Facility: MEDICAL CENTER | Age: 23
End: 2023-12-23
Attending: PHYSICIAN ASSISTANT
Payer: COMMERCIAL

## 2023-12-23 ENCOUNTER — OFFICE VISIT (OUTPATIENT)
Dept: URGENT CARE | Facility: CLINIC | Age: 23
End: 2023-12-23

## 2023-12-23 VITALS
HEART RATE: 68 BPM | WEIGHT: 119.9 LBS | BODY MASS INDEX: 19.27 KG/M2 | OXYGEN SATURATION: 98 % | TEMPERATURE: 97 F | SYSTOLIC BLOOD PRESSURE: 92 MMHG | HEIGHT: 66 IN | DIASTOLIC BLOOD PRESSURE: 64 MMHG

## 2023-12-23 DIAGNOSIS — Z20.2 EXPOSURE TO CHLAMYDIA: ICD-10-CM

## 2023-12-23 LAB
APPEARANCE UR: CLEAR
BILIRUB UR STRIP-MCNC: NEGATIVE MG/DL
COLOR UR AUTO: NORMAL
GLUCOSE UR STRIP.AUTO-MCNC: NEGATIVE MG/DL
KETONES UR STRIP.AUTO-MCNC: NORMAL MG/DL
LEUKOCYTE ESTERASE UR QL STRIP.AUTO: NEGATIVE
NITRITE UR QL STRIP.AUTO: NEGATIVE
PH UR STRIP.AUTO: 5.5 [PH] (ref 5–8)
POCT INT CON NEG: NEGATIVE
POCT INT CON POS: POSITIVE
POCT URINE PREGNANCY TEST: NEGATIVE
PROT UR QL STRIP: NEGATIVE MG/DL
RBC UR QL AUTO: NEGATIVE
SP GR UR STRIP.AUTO: 1.03
UROBILINOGEN UR STRIP-MCNC: 0.2 MG/DL

## 2023-12-23 PROCEDURE — 81025 URINE PREGNANCY TEST: CPT | Performed by: PHYSICIAN ASSISTANT

## 2023-12-23 PROCEDURE — 81002 URINALYSIS NONAUTO W/O SCOPE: CPT | Performed by: PHYSICIAN ASSISTANT

## 2023-12-23 PROCEDURE — 99213 OFFICE O/P EST LOW 20 MIN: CPT | Performed by: PHYSICIAN ASSISTANT

## 2023-12-23 PROCEDURE — 87491 CHLMYD TRACH DNA AMP PROBE: CPT

## 2023-12-23 PROCEDURE — 87591 N.GONORRHOEAE DNA AMP PROB: CPT

## 2023-12-23 RX ORDER — DOXYCYCLINE 100 MG/1
100 CAPSULE ORAL 2 TIMES DAILY
Qty: 14 CAPSULE | Refills: 0 | Status: SHIPPED | OUTPATIENT
Start: 2023-12-23 | End: 2023-12-30

## 2023-12-23 ASSESSMENT — ENCOUNTER SYMPTOMS
VOMITING: 0
NAUSEA: 0
FLANK PAIN: 0
DIARRHEA: 0
CARDIOVASCULAR NEGATIVE: 1
RESPIRATORY NEGATIVE: 1
CONSTITUTIONAL NEGATIVE: 1
ABDOMINAL PAIN: 1

## 2023-12-24 DIAGNOSIS — Z20.2 EXPOSURE TO CHLAMYDIA: ICD-10-CM

## 2023-12-24 NOTE — PROGRESS NOTES
"  Subjective:     Maryam Knowles  is a 23 y.o. female who presents for Exposure to STD (PT has burning urination, lower abdominal pain x 1 month PT here for STD testing )       She presents today requesting treatment for known exposure to sexual partner whom did test positive for chlamydia.  She has been experiencing painful urination as well as lower abdominal discomfort over the past month.  Denies any fevers, no flank pain.  No vaginal pain bleeding or discharge.       Review of Systems   Constitutional: Negative.    Respiratory: Negative.     Cardiovascular: Negative.    Gastrointestinal:  Positive for abdominal pain. Negative for diarrhea, nausea and vomiting.   Genitourinary:  Positive for dysuria. Negative for flank pain, frequency, hematuria and urgency.      No Known Allergies  Past Medical History:   Diagnosis Date    Anxiety     Self-injurious behavior         Objective:   BP 92/64 (BP Location: Right arm, Patient Position: Sitting, BP Cuff Size: Adult)   Pulse 68   Temp 36.1 °C (97 °F) (Temporal)   Ht 1.676 m (5' 6\")   Wt 54.4 kg (119 lb 14.4 oz)   SpO2 98%   BMI 19.35 kg/m²   Physical Exam  Vitals and nursing note reviewed.   Constitutional:       General: She is not in acute distress.     Appearance: She is not ill-appearing or toxic-appearing.   HENT:      Head: Normocephalic.   Eyes:      General: No scleral icterus.     Conjunctiva/sclera: Conjunctivae normal.   Cardiovascular:      Rate and Rhythm: Normal rate and regular rhythm.   Pulmonary:      Effort: Pulmonary effort is normal. No respiratory distress.      Breath sounds: Normal breath sounds. No stridor.   Abdominal:      General: Abdomen is flat. Bowel sounds are normal. There is no distension.      Palpations: Abdomen is soft.      Tenderness: There is no abdominal tenderness. There is no right CVA tenderness, left CVA tenderness or guarding.   Genitourinary:     Comments: Patient deferred examination today  Musculoskeletal: "      Cervical back: Neck supple.   Neurological:      Mental Status: She is alert and oriented to person, place, and time.   Psychiatric:         Mood and Affect: Mood normal.         Behavior: Behavior normal.         Thought Content: Thought content normal.         Judgment: Judgment normal.             Diagnostic testing:    POC urinalysis-trace ketones, all others within normal limits    Urine hCG-negative    Gonorrhea/chlamydia swab-pending    Assessment/Plan:     Encounter Diagnoses   Name Primary?    Exposure to chlamydia           Plan for care for today's complaint includes starting patient empirically on doxycycline due to known exposure to chlamydia.  No evidence of UTI on testing today.  Discussed with the patient withhold from sexual activities and contacts until fully treated, instructed to return following doxycycline regimen to ensure she has been fully treated.  Continue to monitor symptoms and return to urgent care or follow-up with primary care provider if symptoms remain ongoing.  Follow-up in the emergency department if symptoms become severe, ER precautions discussed in office today..  Prescription for doxycycline provided.    See AVS Instructions below for written guidance provided to patient on after-visit management and care in addition to our verbal discussion during the visit.    Please note that this dictation was created using voice recognition software. I have attempted to correct all errors, but there may be sound-alike, spelling, grammar and possibly content errors that I did not discover before finalizing the note.    Bladimir Andrew PA-C

## 2023-12-25 LAB
C TRACH DNA GENITAL QL NAA+PROBE: POSITIVE
N GONORRHOEA DNA GENITAL QL NAA+PROBE: NEGATIVE
SPECIMEN SOURCE: ABNORMAL

## 2024-02-07 ENCOUNTER — OFFICE VISIT (OUTPATIENT)
Dept: INTERNAL MEDICINE | Facility: OTHER | Age: 24
End: 2024-02-07
Payer: COMMERCIAL

## 2024-02-07 VITALS
HEART RATE: 81 BPM | DIASTOLIC BLOOD PRESSURE: 67 MMHG | WEIGHT: 114 LBS | SYSTOLIC BLOOD PRESSURE: 98 MMHG | HEIGHT: 66 IN | BODY MASS INDEX: 18.32 KG/M2 | OXYGEN SATURATION: 96 % | TEMPERATURE: 98.5 F

## 2024-02-07 DIAGNOSIS — R06.2 WHEEZING: ICD-10-CM

## 2024-02-07 DIAGNOSIS — T78.40XD ALLERGIC REACTION, SUBSEQUENT ENCOUNTER: ICD-10-CM

## 2024-02-07 DIAGNOSIS — R06.02 SHORTNESS OF BREATH: ICD-10-CM

## 2024-02-07 DIAGNOSIS — J30.81 ALLERGIC TO CATS: ICD-10-CM

## 2024-02-07 DIAGNOSIS — Z29.9 PREVENTIVE MEASURE: ICD-10-CM

## 2024-02-07 PROBLEM — Z86.79 H/O SUPRAVENTRICULAR TACHYCARDIA: Status: ACTIVE | Noted: 2019-07-16

## 2024-02-07 PROBLEM — Z97.5 NEXPLANON IN PLACE: Status: ACTIVE | Noted: 2020-05-14

## 2024-02-07 PROCEDURE — 99213 OFFICE O/P EST LOW 20 MIN: CPT | Mod: GE | Performed by: STUDENT IN AN ORGANIZED HEALTH CARE EDUCATION/TRAINING PROGRAM

## 2024-02-07 PROCEDURE — 3078F DIAST BP <80 MM HG: CPT | Performed by: STUDENT IN AN ORGANIZED HEALTH CARE EDUCATION/TRAINING PROGRAM

## 2024-02-07 PROCEDURE — 3074F SYST BP LT 130 MM HG: CPT | Performed by: STUDENT IN AN ORGANIZED HEALTH CARE EDUCATION/TRAINING PROGRAM

## 2024-02-07 RX ORDER — LORATADINE 10 MG/1
10 TABLET ORAL DAILY
Qty: 90 TABLET | Refills: 4 | Status: SHIPPED | OUTPATIENT
Start: 2024-02-07

## 2024-02-07 RX ORDER — EPINEPHRINE 0.3 MG/.3ML
0.3 INJECTION SUBCUTANEOUS ONCE
Qty: 1 EACH | Refills: 0 | Status: SHIPPED | OUTPATIENT
Start: 2024-02-07 | End: 2024-02-07

## 2024-02-07 RX ORDER — ALBUTEROL SULFATE 90 UG/1
2 AEROSOL, METERED RESPIRATORY (INHALATION) EVERY 6 HOURS PRN
Qty: 8.5 G | Refills: 0 | Status: SHIPPED | OUTPATIENT
Start: 2024-02-07 | End: 2024-03-06

## 2024-02-07 RX ORDER — FLUTICASONE PROPIONATE AND SALMETEROL 100; 50 UG/1; UG/1
1 POWDER RESPIRATORY (INHALATION) EVERY 12 HOURS
Qty: 14 EACH | Refills: 1 | Status: SHIPPED | OUTPATIENT
Start: 2024-02-07

## 2024-02-07 ASSESSMENT — ENCOUNTER SYMPTOMS
HEADACHES: 0
FEVER: 0
NAUSEA: 0
CHILLS: 0
DOUBLE VISION: 0
HEARTBURN: 0
VOMITING: 0
BLURRED VISION: 0
PALPITATIONS: 0
DIZZINESS: 0

## 2024-02-07 NOTE — PROGRESS NOTES
Chief Complaint   Patient presents with    Wheezing       HISTORY OF PRESENT ILLNESS: Patient is a 23 y.o. female established patient who presents today for the following.      1. Shortness of breath  2. Wheezing  3. Allergic to cats  4. Allergic reaction, subsequent encounter    Patient presented today reporting 2-week history of intermittent shortness of breath with associated wheezing and green sputum production.  Patient states these symptoms began abruptly and lower somewhat improving have unfortunately persisted.  She typically notices the wheezing at night in addition to mild shortness of breath.     Of note, patient is known to be allergic to her cat, previously following with her PCP and later an allergist.  She is not currently taking any medications for allergies and reportedly is never become anaphylactic related to her allergies.  Patient does not carry an EpiPen.  Patient also states she is never been diagnosed with asthma.  Patient also denies having received flu or COVID vaccines this season.  Denies any fevers, chills, nausea, vomiting, chest pain.    Past Medical History:   Diagnosis Date    Anxiety     Self-injurious behavior        Patient Active Problem List    Diagnosis Date Noted    Depression 03/10/2021    Nexplanon in place 05/14/2020    H/O supraventricular tachycardia 07/16/2019       Allergies:Latex, Mangifera indica, and Skin adhesives    Current Outpatient Medications   Medication Sig Dispense Refill    fluticasone-salmeterol (ADVAIR) 100-50 MCG/ACT AEROSOL POWDER, BREATH ACTIVATED Inhale 1 Puff every 12 hours. 14 Each 1    loratadine (CLARITIN) 10 MG Tab Take 1 Tablet by mouth every day. 90 Tablet 4    EPINEPHrine (EPIPEN 2-CATY) 0.3 MG/0.3ML Solution Auto-injector solution for injection Inject 0.3 mL into the shoulder, thigh, or buttocks one time for 1 dose. 1 Each 0    albuterol 108 (90 Base) MCG/ACT Aero Soln inhalation aerosol Inhale 2 Puffs every 6 hours as needed for Shortness of  "Breath. 8.5 g 0    etonogestrel (NEXPLANON) 68 MG Implant implant Inject 1 Each under the skin one time.       No current facility-administered medications for this visit.       Social History     Tobacco Use    Smoking status: Never    Smokeless tobacco: Never   Vaping Use    Vaping Use: Never used   Substance Use Topics    Alcohol use: Not Currently     Comment: a couple  of drinks every other month    Drug use: Not Currently     Types: Marijuana     Comment: past smoke       Family History   Problem Relation Age of Onset    Alcohol abuse Mother     Alcohol abuse Sister          Review of Systems   Review of Systems   Constitutional:  Negative for chills and fever.   Eyes:  Negative for blurred vision and double vision.   Cardiovascular:  Negative for chest pain and palpitations.   Gastrointestinal:  Negative for heartburn, nausea and vomiting.   Skin:  Negative for rash.   Neurological:  Negative for dizziness and headaches.       Exam:  BP 98/67 (BP Location: Right arm, Patient Position: Sitting, BP Cuff Size: Adult)   Pulse 81   Temp 36.9 °C (98.5 °F)   Ht 1.676 m (5' 5.98\")   Wt 51.7 kg (114 lb)   SpO2 96%  Body mass index is 18.41 kg/m².    Constitutional:  Not in acute distress, well appearing.  HEENT:   NC/AT  Cardiovascular: Regular rate and rhythm. No murmurs or gallops.      Lungs:   Clear to auscultation bilaterally. No wheezes or crackles. No respiratory distress.  Abdomen: Not distended, soft, not tender. No guarding or rigidity. No masses.  Extremities:  No cyanosis/clubbing/edema. No obvious deformities.  Skin:  Warm and dry.  No visible rashes.  Neurologic: Alert & oriented x 3, CN II-XII grossly intact, strength and sensation grossly intact.  No focal deficits noted.  Psychiatric:  Affect normal, mood normal, judgment normal.    Assessment/Plan:     1. Shortness of breath  2. Wheezing  Patient states her typical symptoms for her allergies are runny nose and watery eyes.  Therefore have " suspicion for potential underlying asthma, will therefore prescribe patient with Advair inhaler as below in addition to rescue inhaler for trial.  - fluticasone-salmeterol (ADVAIR) 100-50 MCG/ACT AEROSOL POWDER, BREATH ACTIVATED; Inhale 1 Puff every 12 hours.  Dispense: 14 Each; Refill: 1  - albuterol 108 (90 Base) MCG/ACT Aero Soln inhalation aerosol; Inhale 2 Puffs every 6 hours as needed for Shortness of Breath.  Dispense: 8.5 g; Refill: 0  - PCP to consider formal PFTs    3. Allergic to cats  4. Allergic reaction, subsequent encounter  Patient with known allergies to cats in addition to allergies to trees (unspecified type), mangoes.  Patient not currently experiencing any anaphylactic reaction but does report symptoms feeling as if she is undergoing anaphylaxis with facial and throat swelling.  Will therefore initiate patient on clonidine in addition to providing patient with EpiPen as below.  - loratadine (CLARITIN) 10 MG Tab; Take 1 Tablet by mouth every day.  Dispense: 90 Tablet; Refill: 4  - EPINEPHrine (EPIPEN 2-CATY) 0.3 MG/0.3ML Solution Auto-injector solution for injection; Inject 0.3 mL into the shoulder, thigh, or buttocks one time for 1 dose.  Dispense: 1 Each; Refill: 0  - Patient once again reminded of signs and symptoms of anaphylaxis and need for emergent medical attention.    5. Preventive measure  Discussed with patient consideration for flu and COVID-vaccine, patient respectfully declines.  - Defer to PCP all other preventative health measures (i.e. cervical cancer screening, additional vaccines, STD screening).      All imaging results and lab results and consult notes are reviewed at this visit.  Followup: With PCP in approximately 4 weeks to monitor symptoms    Please note that this dictation was created using voice recognition software. I have made every reasonable attempt to correct obvious errors, but I expect that there are errors of grammar and possibly content that I did not discover  before finalizing the note.    Ramez Betancourt MD  PGY-3  Internal Medicine

## 2024-02-07 NOTE — PATIENT INSTRUCTIONS
Thank you for coming today, please follow-up with your PCP in 1 to 2 months  If you experience any facial swelling, throat swelling, difficulty breathing or feel like you are having an anaphylactic reaction, please call 911 and utilize your EpiPen  Please call our clinic if you have any future questions or concerns, or if you would like to be seen sooner for any reason

## 2024-02-15 ENCOUNTER — APPOINTMENT (OUTPATIENT)
Dept: URGENT CARE | Facility: CLINIC | Age: 24
End: 2024-02-15
Payer: COMMERCIAL

## 2024-03-06 DIAGNOSIS — R06.2 WHEEZING: ICD-10-CM

## 2024-03-06 DIAGNOSIS — R06.02 SHORTNESS OF BREATH: ICD-10-CM

## 2024-03-06 RX ORDER — ALBUTEROL SULFATE 90 UG/1
2 AEROSOL, METERED RESPIRATORY (INHALATION) EVERY 6 HOURS PRN
Qty: 8.5 G | Refills: 0 | Status: SHIPPED | OUTPATIENT
Start: 2024-03-06

## 2024-03-06 NOTE — TELEPHONE ENCOUNTER
Rx sent, however patient is a [facility] patient and should follow-up with their clinic for future refills.

## 2024-03-06 NOTE — TELEPHONE ENCOUNTER
Received request via: Pharmacy    Was the patient seen in the last year in this department? Yes    Does the patient have an active prescription (recently filled or refills available) for medication(s) requested? No    Pharmacy Name: [facility]     Does the patient have custodial Plus and need 100 day supply (blood pressure, diabetes and cholesterol meds only)? Patient does not have SCP

## 2024-03-08 ENCOUNTER — APPOINTMENT (OUTPATIENT)
Dept: MEDICAL GROUP | Facility: CLINIC | Age: 24
End: 2024-03-08
Payer: COMMERCIAL

## 2024-03-26 ENCOUNTER — NON-PROVIDER VISIT (OUTPATIENT)
Dept: URGENT CARE | Facility: CLINIC | Age: 24
End: 2024-03-26

## 2024-03-26 DIAGNOSIS — Z11.1 PPD SCREENING TEST: Primary | ICD-10-CM

## 2024-03-26 PROCEDURE — 86580 TB INTRADERMAL TEST: CPT | Performed by: PHYSICIAN ASSISTANT

## 2024-03-27 NOTE — PROGRESS NOTES
Maryam Knowles is a 23 y.o. female here for a non-provider visit for PPD placement -- Step 1 of 1    Reason for PPD:  school requirement    1. TB evaluation questionnaire completed by patient? Yes      -  If any answers marked yes did you contact a provider prior to placing? Not Indicated  2.  Patient notified to return to clinic for reading on: Between 03/28/2024 and 03/29/2024  3.  PPD Placement documentation completed on TB evaluation questionnaire? YES   4.  Location of TB evaluation questionnaire filed: [email or web address]

## 2024-03-29 ENCOUNTER — NON-PROVIDER VISIT (OUTPATIENT)
Dept: URGENT CARE | Facility: CLINIC | Age: 24
End: 2024-03-29

## 2024-03-29 LAB — TB WHEAL 3D P 5 TU DIAM: NORMAL MM

## 2024-03-30 NOTE — PROGRESS NOTES
Maryamchelo Knowles is a 23 y.o. female here for a non-provider visit for PPD reading -- Step 1 of 1.      1.  Resulted in Epic under enter/edit results? Yes   2.  TB evaluation questionnaire scanned into chart and original given to patient?Yes      3. Was induration greater than 0 mm? No.    If Step 1 of 2, when is patient returning for second step (delete if N/A):     Routed to PCP? Yes

## 2024-04-14 ENCOUNTER — OFFICE VISIT (OUTPATIENT)
Dept: URGENT CARE | Facility: CLINIC | Age: 24
End: 2024-04-14
Payer: COMMERCIAL

## 2024-04-14 VITALS
OXYGEN SATURATION: 98 % | WEIGHT: 114.8 LBS | DIASTOLIC BLOOD PRESSURE: 68 MMHG | RESPIRATION RATE: 14 BRPM | TEMPERATURE: 98 F | HEART RATE: 62 BPM | HEIGHT: 66 IN | BODY MASS INDEX: 18.45 KG/M2 | SYSTOLIC BLOOD PRESSURE: 102 MMHG
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
## 2024-04-14 PROCEDURE — 99213 OFFICE O/P EST LOW 20 MIN: CPT | Performed by: PHYSICIAN ASSISTANT

## 2024-04-14 PROCEDURE — 3078F DIAST BP <80 MM HG: CPT | Performed by: PHYSICIAN ASSISTANT

## 2024-04-14 PROCEDURE — 3074F SYST BP LT 130 MM HG: CPT | Performed by: PHYSICIAN ASSISTANT

## 2024-04-14 RX ORDER — FLUCONAZOLE 150 MG/1
TABLET ORAL
COMMUNITY
Start: 2024-04-01

## 2024-04-14 RX ORDER — ESTRADIOL 1 MG/1
1 TABLET ORAL
COMMUNITY
Start: 2024-02-13

## 2024-04-14 RX ORDER — AMOXICILLIN AND CLAVULANATE POTASSIUM 875; 125 MG/1; MG/1
1 TABLET, FILM COATED ORAL 2 TIMES DAILY
Qty: 10 TABLET | Refills: 0 | Status: SHIPPED | OUTPATIENT
Start: 2024-04-14 | End: 2024-04-19

## 2024-04-14 ASSESSMENT — ENCOUNTER SYMPTOMS
NAUSEA: 0
VOMITING: 0
FEVER: 0
CHILLS: 0

## 2024-04-14 NOTE — PROGRESS NOTES
Subjective:   Maryam Knowles is a 23 y.o. female who presents for Cat Bite (Patient is here today for a cat bit. Patient states that it happened this morning by her cat. Patient was bit on her left leg. )        Patient presents with concerns of cat bite to her left lower leg that occurred this morning.  She also has several scratches.  Cat belongs to patient.  Tdap up-to-date.  No nausea, vomiting, fevers, chills.      Review of Systems   Constitutional:  Negative for chills and fever.   Gastrointestinal:  Negative for nausea and vomiting.       PMH:  has a past medical history of Anxiety and Self-injurious behavior.  MEDS:   Current Outpatient Medications:     estradiol (ESTRACE) 1 MG Tab, Take 1 mg by mouth every day., Disp: , Rfl:     fluconazole (DIFLUCAN) 150 MG tablet, TAKE 1 TABLET BY MOUTH ONCE FOR 1 DAY, Disp: , Rfl:     amoxicillin-clavulanate (AUGMENTIN) 875-125 MG Tab, Take 1 Tablet by mouth 2 times a day for 5 days., Disp: 10 Tablet, Rfl: 0    albuterol 108 (90 Base) MCG/ACT Aero Soln inhalation aerosol, INHALE 2 PUFFS BY MOUTH EVERY 6 HOURS AS NEEDED FOR SHORTNESS OF BREATH, Disp: 8.5 g, Rfl: 0    fluticasone-salmeterol (ADVAIR) 100-50 MCG/ACT AEROSOL POWDER, BREATH ACTIVATED, Inhale 1 Puff every 12 hours., Disp: 14 Each, Rfl: 1    loratadine (CLARITIN) 10 MG Tab, Take 1 Tablet by mouth every day., Disp: 90 Tablet, Rfl: 4    etonogestrel (NEXPLANON) 68 MG Implant implant, Inject 1 Each under the skin one time., Disp: , Rfl:   ALLERGIES:   Allergies   Allergen Reactions    Latex     Mangifera Indica     Skin Adhesives      SURGHX: History reviewed. No pertinent surgical history.  SOCHX:  reports that she has never smoked. She has never used smokeless tobacco. She reports that she does not currently use alcohol. She reports that she does not currently use drugs after having used the following drugs: Marijuana.  FH: Family history was reviewed, no pertinent findings to report   Objective:   BP  "102/68   Pulse 62   Temp 36.7 °C (98 °F) (Temporal)   Resp 14   Ht 1.676 m (5' 6\")   Wt 52.1 kg (114 lb 12.8 oz)   SpO2 98%   BMI 18.53 kg/m²   Physical Exam  Vitals reviewed.   Constitutional:       General: She is not in acute distress.     Appearance: Normal appearance. She is well-developed. She is not toxic-appearing.   HENT:      Head: Normocephalic and atraumatic.      Right Ear: External ear normal.      Left Ear: External ear normal.      Nose: Nose normal.   Cardiovascular:      Rate and Rhythm: Normal rate and regular rhythm.   Pulmonary:      Effort: Pulmonary effort is normal. No respiratory distress.      Breath sounds: No stridor.   Skin:     General: Skin is dry.      Comments: Patient has numerous superficial linear abrasions of the distal left lower leg.  No surrounding erythema or edema.  Additionally she has 2 puncture wounds of the central anterior left lower leg.  Mild surrounding edema ecchymosis.  No significant erythema.  Neurovascularly intact distally.   Neurological:      Comments: Alert and oriented.    Psychiatric:         Speech: Speech normal.         Behavior: Behavior normal.           Assessment/Plan:   1. Cat bite, initial encounter  - amoxicillin-clavulanate (AUGMENTIN) 875-125 MG Tab; Take 1 Tablet by mouth 2 times a day for 5 days.  Dispense: 10 Tablet; Refill: 0    Other orders  - estradiol (ESTRACE) 1 MG Tab; Take 1 mg by mouth every day.  - fluconazole (DIFLUCAN) 150 MG tablet; TAKE 1 TABLET BY MOUTH ONCE FOR 1 DAY    Wounds were cleaned.  Recommend starting patient on prophylactic antibiotics due to high risk of infection.  Patient instructed to clean wounds with soap and water and monitor closely for signs and symptoms of infection.  Strict return precautions.  "

## 2024-04-19 ENCOUNTER — EH NON-PROVIDER (OUTPATIENT)
Dept: OCCUPATIONAL MEDICINE | Facility: CLINIC | Age: 24
End: 2024-04-19

## 2024-04-19 ENCOUNTER — EMPLOYEE HEALTH (OUTPATIENT)
Dept: OCCUPATIONAL MEDICINE | Facility: CLINIC | Age: 24
End: 2024-04-19

## 2024-04-19 DIAGNOSIS — Z02.89 ENCOUNTER FOR OCCUPATIONAL HEALTH ASSESSMENT: ICD-10-CM

## 2024-04-19 DIAGNOSIS — Z02.1 PRE-EMPLOYMENT DRUG SCREENING: ICD-10-CM

## 2024-04-19 LAB
AMP AMPHETAMINE: NORMAL
BAR BARBITURATES: NORMAL
BZO BENZODIAZEPINES: NORMAL
COC COCAINE: NORMAL
INT CON NEG: NORMAL
INT CON POS: NORMAL
MDMA ECSTASY: NORMAL
MET METHAMPHETAMINES: NORMAL
MTD METHADONE: NORMAL
OPI OPIATES: NORMAL
OXY OXYCODONE: NORMAL
PCP PHENCYCLIDINE: NORMAL
POC URINE DRUG SCREEN OCDRS: NEGATIVE
THC: NORMAL

## 2024-04-19 PROCEDURE — 94375 RESPIRATORY FLOW VOLUME LOOP: CPT | Performed by: PREVENTIVE MEDICINE

## 2024-04-19 PROCEDURE — 8915 PR COMPREHENSIVE PHYSICAL: Performed by: PREVENTIVE MEDICINE

## 2024-04-19 PROCEDURE — 80305 DRUG TEST PRSMV DIR OPT OBS: CPT | Performed by: PREVENTIVE MEDICINE

## 2024-11-28 ENCOUNTER — OFFICE VISIT (OUTPATIENT)
Dept: URGENT CARE | Facility: CLINIC | Age: 24
End: 2024-11-28
Payer: COMMERCIAL

## 2024-11-28 VITALS
WEIGHT: 119.6 LBS | HEART RATE: 84 BPM | BODY MASS INDEX: 19.22 KG/M2 | RESPIRATION RATE: 12 BRPM | TEMPERATURE: 98.7 F | HEIGHT: 66 IN | DIASTOLIC BLOOD PRESSURE: 62 MMHG | OXYGEN SATURATION: 97 % | SYSTOLIC BLOOD PRESSURE: 112 MMHG

## 2024-11-28 DIAGNOSIS — K08.89 TOOTH PAIN: ICD-10-CM

## 2024-11-28 DIAGNOSIS — K04.7 TOOTH INFECTION: ICD-10-CM

## 2024-11-28 DIAGNOSIS — M27.3 DRY TOOTH SOCKET: ICD-10-CM

## 2024-11-28 RX ORDER — MELOXICAM 15 MG/1
15 TABLET ORAL DAILY
Qty: 30 TABLET | Refills: 0 | Status: SHIPPED | OUTPATIENT
Start: 2024-11-28

## 2024-11-28 ASSESSMENT — ENCOUNTER SYMPTOMS
SINUS PAIN: 0
DIARRHEA: 0
COUGH: 0
SENSORY CHANGE: 0
TINGLING: 0
SHORTNESS OF BREATH: 0
ABDOMINAL PAIN: 0
VOMITING: 0
SPEECH CHANGE: 0
NAUSEA: 0
DIZZINESS: 0
STRIDOR: 0
MYALGIAS: 0
HEADACHES: 1
FEVER: 0
SORE THROAT: 1
FOCAL WEAKNESS: 0
WEAKNESS: 0
WHEEZING: 0
SEIZURES: 0
CHILLS: 0
LOSS OF CONSCIOUSNESS: 0
SPUTUM PRODUCTION: 0

## 2024-11-28 ASSESSMENT — VISUAL ACUITY: OU: 1

## 2024-11-28 NOTE — PROGRESS NOTES
Subjective     Maryam Knowles is a 24 y.o. female who presents with Oral Pain (i had wisdom tooth removed-think i have dry socket was removed on Friday )    HPI:   Maryam is a 23yo female presenting today for oral pain following wisdom teeth removal. Patient reports bilateral upper and lower wisdom teeth were removed 11/22/24. Patient reports dissolvable sutures were placed. Patient reports increasing pain to right lower site with associated pus. She completed a 5-day course of clindamycin as prescribed. Patient reports she followed up with the oral surgeon 2 days ago for this pain and was prescribed Norco. Denies anterior cervical lymph node enlargement or neck swelling. No sinus pain. Denies facial swelling. No fever or shortness of breath. Positive intermittent sore throat. Denies nuchal rigidity. Denies limitation in neck movement. No trouble swallowing or drooling. Denies respiratory distress or airway compromise. She has also attempted ibuprofen for relief.       Review of Systems   Constitutional:  Negative for chills, fever and malaise/fatigue.   HENT:  Positive for sore throat. Negative for congestion, ear discharge, ear pain and sinus pain.    Respiratory:  Negative for cough, sputum production, shortness of breath, wheezing and stridor.    Gastrointestinal:  Negative for abdominal pain, diarrhea, nausea and vomiting.   Musculoskeletal:  Negative for myalgias.   Skin:  Negative for rash.   Neurological:  Positive for headaches. Negative for dizziness, tingling, sensory change, speech change, focal weakness, seizures, loss of consciousness and weakness.     Past Medical History:   Diagnosis Date    Anxiety     Self-injurious behavior      History reviewed. No pertinent surgical history.     Allergies: Latex, Mangifera indica, and Skin adhesives     Current Outpatient Medications:     estradiol (ESTRACE) 1 MG Tab, Take 1 mg by mouth every day., Disp: , Rfl:     fluconazole (DIFLUCAN) 150 MG tablet,  "TAKE 1 TABLET BY MOUTH ONCE FOR 1 DAY, Disp: , Rfl:     albuterol 108 (90 Base) MCG/ACT Aero Soln inhalation aerosol, INHALE 2 PUFFS BY MOUTH EVERY 6 HOURS AS NEEDED FOR SHORTNESS OF BREATH, Disp: 8.5 g, Rfl: 0    fluticasone-salmeterol (ADVAIR) 100-50 MCG/ACT AEROSOL POWDER, BREATH ACTIVATED, Inhale 1 Puff every 12 hours., Disp: 14 Each, Rfl: 1    loratadine (CLARITIN) 10 MG Tab, Take 1 Tablet by mouth every day., Disp: 90 Tablet, Rfl: 4    etonogestrel (NEXPLANON) 68 MG Implant implant, Inject 1 Each under the skin one time., Disp: , Rfl:      Social History     Tobacco Use    Smoking status: Never    Smokeless tobacco: Never   Vaping Use    Vaping status: Never Used   Substance Use Topics    Alcohol use: Not Currently     Comment: a couple  of drinks every other month    Drug use: Not Currently     Types: Marijuana     Comment: past smoke     Family History   Problem Relation Age of Onset    Alcohol abuse Mother     Alcohol abuse Sister      Medications, Allergies, and current problem list reviewed today in Epic.      Objective     /62 (BP Location: Left arm, Patient Position: Sitting)   Pulse 84   Temp 37.1 °C (98.7 °F) (Temporal)   Resp 12   Ht 1.676 m (5' 6\")   Wt 54.3 kg (119 lb 9.6 oz)   SpO2 97%   BMI 19.30 kg/m²      Physical Exam  Vitals reviewed.   Constitutional:       General: She is not in acute distress.  HENT:      Head: No right periorbital erythema or left periorbital erythema.      Jaw: There is normal jaw occlusion. No tenderness, swelling or malocclusion.      Right Ear: Tympanic membrane, ear canal and external ear normal.      Left Ear: Tympanic membrane, ear canal and external ear normal.      Nose: Nose normal.      Mouth/Throat:      Lips: No lesions.      Mouth: Mucous membranes are moist. No oral lesions or angioedema.      Dentition: Abnormal dentition. Dental tenderness present. No gingival swelling, dental abscesses or gum lesions.      Tongue: No lesions. Tongue does not " deviate from midline.      Palate: No mass and lesions.      Pharynx: Uvula midline. No oropharyngeal exudate, posterior oropharyngeal erythema or uvula swelling.      Comments: Inflammation and exposed bone to right lower wisdom tooth removal site. Positive purulent discharge. No visible abscess. Oropharynx without erythema.   Eyes:      General: Vision grossly intact. Gaze aligned appropriately. No visual field deficit.     Extraocular Movements: Extraocular movements intact.      Conjunctiva/sclera: Conjunctivae normal.      Pupils: Pupils are equal, round, and reactive to light.      Right eye: Pupil is round, reactive and not sluggish.      Left eye: Pupil is round, reactive and not sluggish.      Funduscopic exam:     Right eye: Red reflex present.         Left eye: Red reflex present.  Neck:      Trachea: Trachea normal. No abnormal tracheal secretions or tracheal deviation.   Cardiovascular:      Rate and Rhythm: Normal rate and regular rhythm.      Pulses: Normal pulses.      Heart sounds: Normal heart sounds.   Pulmonary:      Effort: Pulmonary effort is normal. No tachypnea, accessory muscle usage, prolonged expiration, respiratory distress or retractions.      Breath sounds: Normal breath sounds. No stridor, decreased air movement or transmitted upper airway sounds. No wheezing, rhonchi or rales.   Musculoskeletal:         General: Normal range of motion.      Cervical back: Full passive range of motion without pain, normal range of motion and neck supple. No rigidity. Normal range of motion.   Skin:     General: Skin is warm and dry.      Findings: No rash.   Neurological:      Mental Status: She is alert. Mental status is at baseline.   Psychiatric:         Mood and Affect: Mood normal.         Behavior: Behavior normal.         Thought Content: Thought content normal.       Assessment & Plan     1. Tooth infection   - amoxicillin-clavulanate (AUGMENTIN) 875-125 MG Tab; Take 1 Tablet by mouth 2 times a  day for 7 days.  Dispense: 14 Tablet; Refill: 0    2. Tooth pain   - meloxicam (MOBIC) 15 MG tablet; Take 1 Tablet by mouth every day.  Dispense: 30 Tablet; Refill: 0    3. Dry tooth socket       MDM/Comments:   Vital signs stable and patient non-toxic appearing.   Patient with dental pain, history and examination most consistent with dry socket s/p wisdom tooth removal. Evidence of evolving oral infection, will treat with Augmentin. Will prescribe Meloxicam for pain. Patient advised to follow up with dentist/oral surgeon as soon as possible. No evidence of facial cellulitis or abscess at this time. Patient instructed to continue following recommendations from oral surgeon.   STRICT emergency room precautions given including signs and symptoms of abscess and cellulitis. Patient verbalizes understanding of when to present to emergency room or call 911.   No red flag/alarm feature findings present on history or examination.     If patient develops severe symptoms such as drooling/difficulty swallowing, difficulty opening their mouth, facial/neck redness or swelling, audible stridor or wheezing, difficulty moving their neck or other severe and concerning symptoms-I recommend that they go to the emergency room for further evaluation and management.    Illness progression and alarm symptoms discussed with patient, emphasizing low threshold for returning to clinic/emergency department for worsening symptoms. Patient is agreeable to the plan and verbalizes understanding, and will follow up if warranted.       Differential diagnosis, natural history, supportive care, and indications for immediate follow-up discussed.     Follow-up as needed if symptoms worsen or fail to improve to PCP, Urgent care or Emergency Room.                         Electronically signed by SAMIR Ware

## 2025-02-25 ENCOUNTER — HOSPITAL ENCOUNTER (OUTPATIENT)
Facility: MEDICAL CENTER | Age: 25
End: 2025-02-25
Payer: COMMERCIAL

## 2025-02-25 PROCEDURE — 87591 N.GONORRHOEAE DNA AMP PROB: CPT

## 2025-02-25 PROCEDURE — 88142 CYTOPATH C/V THIN LAYER: CPT

## 2025-02-25 PROCEDURE — 87491 CHLMYD TRACH DNA AMP PROBE: CPT

## 2025-02-26 LAB
C TRACH DNA GENITAL QL NAA+PROBE: NEGATIVE
N GONORRHOEA DNA GENITAL QL NAA+PROBE: NEGATIVE
SPECIMEN SOURCE: NORMAL

## 2025-03-01 LAB — THINPREP PAP, CYTOLOGY NL11781: NORMAL
